# Patient Record
Sex: FEMALE | Race: WHITE
[De-identification: names, ages, dates, MRNs, and addresses within clinical notes are randomized per-mention and may not be internally consistent; named-entity substitution may affect disease eponyms.]

---

## 2019-10-07 ENCOUNTER — HOSPITAL ENCOUNTER (OUTPATIENT)
Dept: HOSPITAL 75 - CARD | Age: 69
End: 2019-10-07
Attending: INTERNAL MEDICINE
Payer: COMMERCIAL

## 2019-10-07 DIAGNOSIS — I34.0: Primary | ICD-10-CM

## 2019-10-07 DIAGNOSIS — I10: ICD-10-CM

## 2019-10-07 PROCEDURE — 93351 STRESS TTE COMPLETE: CPT

## 2019-10-07 PROCEDURE — 93306 TTE W/DOPPLER COMPLETE: CPT

## 2021-12-21 ENCOUNTER — HOSPITAL ENCOUNTER (OUTPATIENT)
Dept: HOSPITAL 75 - LAB | Age: 71
End: 2021-12-21
Attending: NURSE PRACTITIONER
Payer: MEDICARE

## 2021-12-21 DIAGNOSIS — Z86.73: ICD-10-CM

## 2021-12-21 DIAGNOSIS — R55: ICD-10-CM

## 2021-12-21 DIAGNOSIS — F03.90: Primary | ICD-10-CM

## 2021-12-21 LAB
ALBUMIN SERPL-MCNC: 4.5 GM/DL (ref 3.2–4.5)
ALP SERPL-CCNC: 101 U/L (ref 40–136)
ALT SERPL-CCNC: 26 U/L (ref 0–55)
APTT BLD: 25 SEC (ref 24–35)
BASOPHILS # BLD AUTO: 0.1 10^3/UL (ref 0–0.1)
BASOPHILS NFR BLD AUTO: 2 % (ref 0–10)
BILIRUB SERPL-MCNC: 0.6 MG/DL (ref 0.1–1)
BUN/CREAT SERPL: 13
CALCIUM SERPL-MCNC: 9.9 MG/DL (ref 8.5–10.1)
CHLORIDE SERPL-SCNC: 105 MMOL/L (ref 98–107)
CHOLEST SERPL-MCNC: 198 MG/DL (ref ?–200)
CO2 SERPL-SCNC: 27 MMOL/L (ref 21–32)
CREAT SERPL-MCNC: 0.99 MG/DL (ref 0.6–1.3)
EOSINOPHIL # BLD AUTO: 0.1 10^3/UL (ref 0–0.3)
EOSINOPHIL NFR BLD AUTO: 3 % (ref 0–10)
ERYTHROCYTE [SEDIMENTATION RATE] IN BLOOD: 11 MM/HR (ref 0–30)
GFR SERPLBLD BASED ON 1.73 SQ M-ARVRAT: 55 ML/MIN
GLUCOSE SERPL-MCNC: 93 MG/DL (ref 70–105)
HCT VFR BLD CALC: 46 % (ref 35–52)
HDLC SERPL-MCNC: 80 MG/DL (ref 40–60)
HGB BLD-MCNC: 15 G/DL (ref 11.5–16)
INR PPP: 0.9 (ref 0.8–1.4)
LYMPHOCYTES # BLD AUTO: 1.3 10^3/UL (ref 1–4)
LYMPHOCYTES NFR BLD AUTO: 29 % (ref 12–44)
MANUAL DIFFERENTIAL PERFORMED BLD QL: NO
MCH RBC QN AUTO: 28 PG (ref 25–34)
MCHC RBC AUTO-ENTMCNC: 33 G/DL (ref 32–36)
MCV RBC AUTO: 87 FL (ref 80–99)
MONOCYTES # BLD AUTO: 0.4 10^3/UL (ref 0–1)
MONOCYTES NFR BLD AUTO: 9 % (ref 0–12)
NEUTROPHILS # BLD AUTO: 2.6 10^3/UL (ref 1.8–7.8)
NEUTROPHILS NFR BLD AUTO: 57 % (ref 42–75)
PLATELET # BLD: 220 10^3/UL (ref 130–400)
PMV BLD AUTO: 10 FL (ref 9–12.2)
POTASSIUM SERPL-SCNC: 3.9 MMOL/L (ref 3.6–5)
PROT SERPL-MCNC: 7.8 GM/DL (ref 6.4–8.2)
PROTHROMBIN TIME: 12.5 SEC (ref 12.2–14.7)
SODIUM SERPL-SCNC: 141 MMOL/L (ref 135–145)
TRIGL SERPL-MCNC: 68 MG/DL (ref ?–150)
VLDLC SERPL CALC-MCNC: 14 MG/DL (ref 5–40)
WBC # BLD AUTO: 4.5 10^3/UL (ref 4.3–11)

## 2021-12-21 PROCEDURE — 36415 COLL VENOUS BLD VENIPUNCTURE: CPT

## 2021-12-21 PROCEDURE — 80053 COMPREHEN METABOLIC PANEL: CPT

## 2021-12-21 PROCEDURE — 85652 RBC SED RATE AUTOMATED: CPT

## 2021-12-21 PROCEDURE — 85025 COMPLETE CBC W/AUTO DIFF WBC: CPT

## 2021-12-21 PROCEDURE — 80061 LIPID PANEL: CPT

## 2021-12-21 PROCEDURE — 85610 PROTHROMBIN TIME: CPT

## 2021-12-21 PROCEDURE — 86141 C-REACTIVE PROTEIN HS: CPT

## 2021-12-21 PROCEDURE — 85730 THROMBOPLASTIN TIME PARTIAL: CPT

## 2021-12-30 ENCOUNTER — HOSPITAL ENCOUNTER (OUTPATIENT)
Dept: HOSPITAL 75 - RAD | Age: 71
End: 2021-12-30
Attending: NURSE PRACTITIONER
Payer: COMMERCIAL

## 2021-12-30 DIAGNOSIS — G30.9: Primary | ICD-10-CM

## 2021-12-30 DIAGNOSIS — I63.9: ICD-10-CM

## 2021-12-30 PROCEDURE — 70450 CT HEAD/BRAIN W/O DYE: CPT

## 2021-12-30 NOTE — DIAGNOSTIC IMAGING REPORT
PROCEDURE: CT head without contrast.



TECHNIQUE: Multiple contiguous axial images were obtained through

the brain without the use of intravenous contrast. Auto Exposure

Controls were utilized during the CT exam to meet ALARA standards

for radiation dose reduction. 



INDICATION:  Alzheimer's, CVA



There are no prior studies available for comparison.



There is no mass, shift of midline or hemorrhage to suggest an

acute intracranial abnormality. The ventricles are not abnormally

dilated. There are mild senescent changes including cortical

atrophy and periventricular encephalomalacia. The bone windows

show no sign of a fracture or of a destructive lesion. The orbits

and sinuses were not visualized in their entirety. Where

visualized there is no acute abnormality.



IMPRESSION:

1. There is no evidence for an acute intracranial abnormality.

There is no sign of a mass lesion either.

2. If clinical concern regarding an underlying abnormality

persists, MRI would be recommended for further study.



Dictated by: 



  Dictated on workstation # [id number]

## 2022-04-25 ENCOUNTER — HOSPITAL ENCOUNTER (OUTPATIENT)
Dept: HOSPITAL 75 - RAD | Age: 72
End: 2022-04-25
Attending: NURSE PRACTITIONER
Payer: COMMERCIAL

## 2022-04-25 DIAGNOSIS — Z86.73: ICD-10-CM

## 2022-04-25 DIAGNOSIS — R55: Primary | ICD-10-CM

## 2022-04-25 DIAGNOSIS — F03.90: ICD-10-CM

## 2022-04-25 PROCEDURE — 70450 CT HEAD/BRAIN W/O DYE: CPT

## 2022-04-25 NOTE — DIAGNOSTIC IMAGING REPORT
PROCEDURE: CT head without contrast.



TECHNIQUE: Multiple contiguous axial images were obtained through

the brain without the use of intravenous contrast. Auto Exposure

Controls were utilized during the CT exam to meet ALARA standards

for radiation dose reduction. 



INDICATION: Syncope and dementia.



Comparison is made with prior head CT from 12/30/2021.



FINDINGS: Ventricles and sulci are within normal limits. No

sulcal effacement or midline shift is identified. No acute

intra-axial or extra-axial hemorrhage is detected. Cisterns are

patent. Visualized paranasal sinuses are clear.



IMPRESSION: No acute intracranial process is detected.



Dictated by: 



  Dictated on workstation # [id number]

## 2023-10-02 ENCOUNTER — HOSPITAL ENCOUNTER (EMERGENCY)
Dept: HOSPITAL 75 - ER | Age: 73
Discharge: HOME | End: 2023-10-02
Payer: MEDICARE

## 2023-10-02 VITALS — HEIGHT: 65 IN | BODY MASS INDEX: 28.32 KG/M2 | WEIGHT: 169.98 LBS

## 2023-10-02 VITALS — SYSTOLIC BLOOD PRESSURE: 151 MMHG | DIASTOLIC BLOOD PRESSURE: 64 MMHG
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
## 2023-10-02 DIAGNOSIS — M25.561: Primary | ICD-10-CM

## 2023-10-02 PROCEDURE — 73562 X-RAY EXAM OF KNEE 3: CPT

## 2023-10-02 NOTE — ED LOWER EXTREMITY
General


Chief Complaint:  Lower Extremity


Stated Complaint:  FALL | RT KNEE INJ


Nursing Triage Note:  


PT ARRIVED POV WITH CC OF RIGHT KNEE PAIN AFTER SHE FELL AND HIT IT ON SATURDAY 


WHILE PUTTING ON HER SHOES.


Source:  patient


Exam Limitations:  no limitations





History of Present Illness


Date Seen by Provider:  Oct 2, 2023


Time Seen by Provider:  14:52


Initial Comments


Patient is a 73-year-old female who presents to the ED with right knee pain.  

Patient fell while getting her shoes on Saturday.  She states she hit the corner

of a wooden frame.  Patient has been able to stand and bear weight but states 

she is wobbling.  Worsening pain today difficulty standing and bearing weight.  

She has been taken ibuprofen with some improvement.  She has been using ice and 

heat.  No history of previous fracture.  She denies hitting her head or loss of 

consciousness, nausea, vomiting, diarrhea, fever, chills





Allergies and Home Medications


Patient Home Medication List


Home Medication List Reviewed:  Yes





Review of Systems


Constitutional:  No chills, No diaphoresis


EENTM:  No hearing loss, No ear pain, No blurred vision


Respiratory:  No cough, No dyspnea on exertion


Cardiovascular:  No chest pain, No edema


Gastrointestinal:  No abdominal pain, No diarrhea, No nausea


Genitourinary:  No decreased output, No discharge


Musculoskeletal:  No back pain; joint pain, joint swelling, muscle pain


Skin:  No change in color, No change in hair/nails





All Other Systems Reviewed


Negative Unless Noted:  Yes





Past Medical-Social-Family Hx


Patient Social History


Tobacco Use?:  No


Substance use?:  No


Alcohol Use?:  No





Past Medical History


Surgery/Hospitalization HX:  


HYSTERECTOMY, C SECTION





Physical Exam


Vital Signs





Vital Signs - First Documented








 10/2/23





 14:43


 


Pulse 64


 


B/P (MAP) 144/71 (95)


 


Pulse Ox 99


 


O2 Delivery Room Air





Capillary Refill :


Height, Weight, BMI


Height: '"


Weight: lbs. oz. kg; 28.00 BMI


Method:


General Appearance:  WD/WN, no apparent distress


HEENT:  PERRL/EOMI, normal ENT inspection, TMs normal, pharynx normal


Neck:  non-tender, full range of motion, supple


Cardiovascular:  regular rate, rhythm, no edema, no gallop, no JVD


Respiratory:  chest non-tender, lungs clear, normal breath sounds, no 

respiratory distress, no accessory muscle use


Gastrointestinal:  normal bowel sounds, non tender, soft, no organomegaly


Back:  normal inspection


Hips:  bilateral hip non-tender, bilateral hip normal inspection, bilateral hip 

normal range of motion


Knees:  right knee swelling


Feet:  bilateral foot non-tender, bilateral foot normal inspection, bilateral 

foot normal range of motion


Neurologic/Psychiatric:  CNs II-XII nml as tested, no motor/sensory deficits, 

alert, normal mood/affect, oriented x 3


Skin:  normal color, warm/dry





Progress/Results/Core Measures


Results/Orders


My Orders





Orders - KATHI ENRIQUEZ


Knee, Right, 3 Views (10/2/23 14:52)





Vital Signs/I&O











 10/2/23 10/2/23





 14:43 15:45


 


Pulse 64 


 


B/P (MAP) 144/71 (95) 151/64


 


Pulse Ox 99 


 


O2 Delivery Room Air 














Blood Pressure Mean:                    95











Departure


Communication (PCP)


Patient presents ED with right knee injury.  Differential diagnosis, knee 

fracture, bone contusion, muscle contusion, ligament injury.  this occurred on 

Saturday by a mechanical fall.  Able to ambulate but reports increasing pain 

today.  On exam right medial knee tenderness.  Adequate active and range of 

motion.  X-ray was obtained which did not note any acute fracture.  She refusing

thing for pain.  Has been taking ibuprofen ice and heat at home.  At this time 

recommend rest, anti-inflammatories.  Orthopedic follow-up in 7 to 10 days if 

pain progress.  Patient agrees with plan of action.  Able to ambulate and bear 

weight here.





Impression





   Primary Impression:  


   Knee pain


Disposition:  01 HOME, SELF-CARE


Condition:  Stable





Departure-Patient Inst.


Decision time for Depature:  15:33


Referrals:  


NO,LOCAL PHYSICIAN (PCP)


Primary Care Physician








DAVIDA SAMAYOA MD


Patient Instructions:  Knee pain





Add. Discharge Instructions:  


X-ray was negative for fracture.  At this time limiting weightbearing.  

Stretching, ice anti-inflammatories.  Orthopedic follow-up in 7 to 10 days if 

pain progress.





All discharge instructions reviewed with patient and/or family. Voiced 

understanding.











KATHI ENRIQUEZ           Oct 2, 2023 14:54

## 2023-10-02 NOTE — DIAGNOSTIC IMAGING REPORT
INDICATION: Right knee pain.



AP, oblique, and lateral views of the right knee are obtained.



FINDINGS: No fracture or acute bony abnormality is seen. There is

no overt joint effusion. There is mild degenerative change of the

patellofemoral joint.



IMPRESSION:



Mild degenerative change with no acute abnormality of the right

knee.



Dictated by: 



  Dictated on workstation # [id number]